# Patient Record
Sex: FEMALE | Race: WHITE
[De-identification: names, ages, dates, MRNs, and addresses within clinical notes are randomized per-mention and may not be internally consistent; named-entity substitution may affect disease eponyms.]

---

## 2019-08-11 ENCOUNTER — HOSPITAL ENCOUNTER (INPATIENT)
Dept: HOSPITAL 72 - EMR | Age: 74
LOS: 2 days | Discharge: HOME | DRG: 425 | End: 2019-08-13
Payer: MEDICARE

## 2019-08-11 VITALS — DIASTOLIC BLOOD PRESSURE: 74 MMHG | SYSTOLIC BLOOD PRESSURE: 179 MMHG

## 2019-08-11 VITALS — WEIGHT: 126 LBS | BODY MASS INDEX: 22.61 KG/M2 | HEIGHT: 62.5 IN

## 2019-08-11 VITALS — DIASTOLIC BLOOD PRESSURE: 54 MMHG | SYSTOLIC BLOOD PRESSURE: 103 MMHG

## 2019-08-11 VITALS — SYSTOLIC BLOOD PRESSURE: 166 MMHG | DIASTOLIC BLOOD PRESSURE: 85 MMHG

## 2019-08-11 VITALS — DIASTOLIC BLOOD PRESSURE: 65 MMHG | SYSTOLIC BLOOD PRESSURE: 174 MMHG

## 2019-08-11 DIAGNOSIS — R19.7: ICD-10-CM

## 2019-08-11 DIAGNOSIS — E87.2: ICD-10-CM

## 2019-08-11 DIAGNOSIS — N39.0: ICD-10-CM

## 2019-08-11 DIAGNOSIS — E87.5: Primary | ICD-10-CM

## 2019-08-11 DIAGNOSIS — Z91.15: ICD-10-CM

## 2019-08-11 DIAGNOSIS — N18.6: ICD-10-CM

## 2019-08-11 DIAGNOSIS — B96.1: ICD-10-CM

## 2019-08-11 DIAGNOSIS — I12.0: ICD-10-CM

## 2019-08-11 LAB
ADD MANUAL DIFF: YES
ALBUMIN SERPL-MCNC: 4 G/DL (ref 3.4–5)
ALBUMIN/GLOB SERPL: 0.9 {RATIO} (ref 1–2.7)
ALP SERPL-CCNC: 157 U/L (ref 46–116)
ALT SERPL-CCNC: 39 U/L (ref 12–78)
ANION GAP SERPL CALC-SCNC: 20 MMOL/L (ref 5–15)
ANION GAP SERPL CALC-SCNC: 23 MMOL/L (ref 5–15)
APPEARANCE UR: (no result)
APTT BLD: 52 SEC (ref 23–33)
APTT PPP: (no result) S
AST SERPL-CCNC: 38 U/L (ref 15–37)
BILIRUB SERPL-MCNC: 0.6 MG/DL (ref 0.2–1)
BUN SERPL-MCNC: 230 MG/DL (ref 7–18)
BUN SERPL-MCNC: 243 MG/DL (ref 7–18)
CALCIUM SERPL-MCNC: 10.1 MG/DL (ref 8.5–10.1)
CALCIUM SERPL-MCNC: 10.7 MG/DL (ref 8.5–10.1)
CHLORIDE SERPL-SCNC: 102 MMOL/L (ref 98–107)
CHLORIDE SERPL-SCNC: 102 MMOL/L (ref 98–107)
CO2 SERPL-SCNC: 15 MMOL/L (ref 21–32)
CO2 SERPL-SCNC: 17 MMOL/L (ref 21–32)
CREAT SERPL-MCNC: 13.7 MG/DL (ref 0.55–1.3)
CREAT SERPL-MCNC: 14 MG/DL (ref 0.55–1.3)
ERYTHROCYTE [DISTWIDTH] IN BLOOD BY AUTOMATED COUNT: 14 % (ref 11.6–14.8)
GLOBULIN SER-MCNC: 4.3 G/DL
GLUCOSE UR STRIP-MCNC: (no result) MG/DL
HCT VFR BLD CALC: 46.9 % (ref 37–47)
HGB BLD-MCNC: 15.9 G/DL (ref 12–16)
INR PPP: 1.1 (ref 0.9–1.1)
KETONES UR QL STRIP: NEGATIVE
LEUKOCYTE ESTERASE UR QL STRIP: (no result)
MCV RBC AUTO: 106 FL (ref 80–99)
NITRITE UR QL STRIP: NEGATIVE
PH UR STRIP: 6 [PH] (ref 4.5–8)
PLATELET # BLD: 94 K/UL (ref 150–450)
POTASSIUM SERPL-SCNC: 5 MMOL/L (ref 3.5–5.1)
POTASSIUM SERPL-SCNC: 7.9 MMOL/L (ref 3.5–5.1)
PROT UR QL STRIP: (no result)
RBC # BLD AUTO: 4.42 M/UL (ref 4.2–5.4)
SODIUM SERPL-SCNC: 136 MMOL/L (ref 136–145)
SODIUM SERPL-SCNC: 142 MMOL/L (ref 136–145)
SP GR UR STRIP: 1.01 (ref 1–1.03)
UROBILINOGEN UR-MCNC: NORMAL MG/DL (ref 0–1)
WBC # BLD AUTO: 6 K/UL (ref 4.8–10.8)

## 2019-08-11 PROCEDURE — 87081 CULTURE SCREEN ONLY: CPT

## 2019-08-11 PROCEDURE — 81001 URINALYSIS AUTO W/SCOPE: CPT

## 2019-08-11 PROCEDURE — 80053 COMPREHEN METABOLIC PANEL: CPT

## 2019-08-11 PROCEDURE — 82962 GLUCOSE BLOOD TEST: CPT

## 2019-08-11 PROCEDURE — 93005 ELECTROCARDIOGRAM TRACING: CPT

## 2019-08-11 PROCEDURE — 85730 THROMBOPLASTIN TIME PARTIAL: CPT

## 2019-08-11 PROCEDURE — 87086 URINE CULTURE/COLONY COUNT: CPT

## 2019-08-11 PROCEDURE — 85025 COMPLETE CBC W/AUTO DIFF WBC: CPT

## 2019-08-11 PROCEDURE — 5A1D70Z PERFORMANCE OF URINARY FILTRATION, INTERMITTENT, LESS THAN 6 HOURS PER DAY: ICD-10-PCS

## 2019-08-11 PROCEDURE — 85610 PROTHROMBIN TIME: CPT

## 2019-08-11 PROCEDURE — 80048 BASIC METABOLIC PNL TOTAL CA: CPT

## 2019-08-11 PROCEDURE — 87181 SC STD AGAR DILUTION PER AGT: CPT

## 2019-08-11 PROCEDURE — 85007 BL SMEAR W/DIFF WBC COUNT: CPT

## 2019-08-11 PROCEDURE — 84484 ASSAY OF TROPONIN QUANT: CPT

## 2019-08-11 PROCEDURE — 96375 TX/PRO/DX INJ NEW DRUG ADDON: CPT

## 2019-08-11 PROCEDURE — 96365 THER/PROPH/DIAG IV INF INIT: CPT

## 2019-08-11 PROCEDURE — 84443 ASSAY THYROID STIM HORMONE: CPT

## 2019-08-11 PROCEDURE — 94640 AIRWAY INHALATION TREATMENT: CPT

## 2019-08-11 PROCEDURE — 74176 CT ABD & PELVIS W/O CONTRAST: CPT

## 2019-08-11 PROCEDURE — 99285 EMERGENCY DEPT VISIT HI MDM: CPT

## 2019-08-11 PROCEDURE — 36415 COLL VENOUS BLD VENIPUNCTURE: CPT

## 2019-08-11 NOTE — NUR
NURSE NOTES:

Received pt and report from CHERELLE Rodriguez from [facility]. DX Hyperkalemia, pt's awake alert, 
oriented x 4, SR on the monitor /65 with two AV shunts Rt arm amd left arm, both are 
non working. Left femoral AV shunt with good bruit . Pt verbalized that left femoral shunt 
have been used for hemodialysis. Boyfriend at bedside. HOB elevated, bed in low and locked 
position. Will continue to monitor.

## 2019-08-11 NOTE — NUR
ED Nurse Note:

pt walked in to ED with boyfriend due to feeling depressed than normal.  old 
shunt noted on both upper arms.  per pt, access on left groin area.  every Tue, 
Thurs and Sat.  last one done about week ago.  pt does not want to talk why she 
missed the dialysis.  AAO x4.  respirations even and non-labored noted.  pt 
walk with cane.  on cardiac monitor.  will wait for the further order.

## 2019-08-11 NOTE — NUR
NURSE NOTES:

called Dr Tellez and jyoti vasques of pts condition, and lab results, with orders given and 
carried out.

## 2019-08-11 NOTE — NUR
NURSE NOTES:

Received pt from ER with DX Hyperkalemia, awake alert, oriented x 3 limon x4 SR on the monitor 
/85 with AV shunt Rt arm, left arm both are non working. Left femoral AV shunt with 
good bruit . Pt verbalized that left femoral shunt have been used for hemodialysis. 
boyfriend at bedside. explained SDu protocol and set up= verbalized understanding.

## 2019-08-11 NOTE — NUR
NURSE NOTES:

[facility] is at the bedside, starting to dialyze the pt. Pt's stable, in no acute distress. 
Will continue to monitor.

## 2019-08-11 NOTE — EMERGENCY ROOM REPORT
History of Present Illness


General


Chief Complaint:  General Complaint


Source:  Patient





Present Illness


HPI


Patient is a 73-year-old female who presents after increased diarrheal stools 

as well as increased generalized weakness.  Patient reports having missed 

dialysis and states her last dialysis was greater than 1 week ago  She states 

she normally dialyzed 3 times a week.  Reports having prior history of end-

stage renal disease.  She has dialysis access to the left femoral area.  

Patient does report making urine.Patient's nephrologist is Dr. Figueredo.Patient 

reports having multiple episodes of diarrheal stools.  Patient does report 

feeling constipated


Allergies:  


Coded Allergies:  


     No Known Allergies (Unverified , 8/11/19)





Patient History


Past Medical History:  see triage record


Reviewed Nursing Documentation:  PMH: Agreed; PSxH: Agreed





Nursing Documentation-PMH


Past Medical History:  No History, Except For


Hx Hypertension:  Yes


Hx Dialysis:  Yes - Tue, Thurs, Sat





Review of Systems


All Other Systems:  limited - by poor historian





Physical Exam





Vital Signs








  Date Time  Temp Pulse Resp B/P (MAP) Pulse Ox O2 Delivery O2 Flow Rate FiO2


 


8/11/19 16:42 97.3 80 18 179/74 (109) 100 Room Air  








Sp02 EP Interpretation:  reviewed, normal


General Appearance:  well appearing, alert, GCS 15, Chronically Ill


Head:  atraumatic


ENT:  normal ENT inspection, hearing grossly normal, normal voice


Neck:  normal inspection, supple, no bony tend, limited range of motion


Respiratory:  normal inspection, lungs clear, normal breath sounds, no 

respiratory distress, no retraction, no wheezing


Cardiovascular #1:  regular rate, rhythm


Gastrointestinal:  normal bowel sounds, soft, no guarding, no hernia, 

tenderness - left upper abdomen


Genitourinary:  no CVA tenderness


Musculoskeletal:  normal inspection, back normal, normal range of motion


Neurologic:  normal inspection, alert, oriented x3, responsive, speech normal


Psychiatric:  normal inspection, judgement/insight normal, mood/affect normal





Medical Decision Making


Diagnostic Impression:  


 Primary Impression:  


 Missed dialysis


 Additional Impressions:  


 Hyperkalemia


 Uremia


 Metabolic acidosis


 ESRD (end stage renal disease) on dialysis


ER Course


Patient presented for increased Abdominal pain and missed dialysis.  

Differential diagnosis include was not limited to hyperkalemia, pericarditis, 

myocardial injury, uremia among others.  Because of complexity of patient's 

case laboratory testing and imaging studies were ordered.   patient is noted to 

have increased generalized weakness.Laboratory testing showed markedly elevated 

serum potassium as well as elevated BUN/creatinine consistent with patient's 

history of missed dialysis. Patient does not appear to be in any respiratory 

distress.  CT imaging of the abdomen pelvis was ordered due to patient's 

nonspecific abdominal pain.  CT read by radiology showed diffuse osteosclerosis 

patient was noted to have no CT imaging findings suspicious for bowel ischemia. 

She was given IV Lasix as well as oral Kayexalate.  EKG showed left bundle 

branch block.  Dr. Robbie Tellez was contacted for inpatient management due 

to panel physician.





Labs








Test


  8/11/19


17:48


 


White Blood Count


  6.0 K/UL


(4.8-10.8)


 


Red Blood Count


  4.42 M/UL


(4.20-5.40)


 


Hemoglobin


  15.9 G/DL


(12.0-16.0)


 


Hematocrit


  46.9 %


(37.0-47.0)


 


Mean Corpuscular Volume 106 FL (80-99) 


 


Mean Corpuscular Hemoglobin


  36.1 PG


(27.0-31.0)


 


Mean Corpuscular Hemoglobin


Concent 34.0 G/DL


(32.0-36.0)


 


Red Cell Distribution Width


  14.0 %


(11.6-14.8)


 


Platelet Count


  94 K/UL


(150-450)


 


Mean Platelet Volume


  6.6 FL


(6.5-10.1)


 


Neutrophils (%) (Auto)  % (45.0-75.0) 


 


Lymphocytes (%) (Auto)  % (20.0-45.0) 


 


Monocytes (%) (Auto)  % (1.0-10.0) 


 


Eosinophils (%) (Auto)  % (0.0-3.0) 


 


Basophils (%) (Auto)  % (0.0-2.0) 


 


Prothrombin Time


  11.4 SEC


(9.30-11.50)


 


Prothromb Time International


Ratio 1.1 (0.9-1.1) 


 


 


Activated Partial


Thromboplast Time 52 SEC (23-33) 


 


 


Urine Color Pale yellow 


 


Urine Appearance


  Slightly


cloudy


 


Urine pH 6 (4.5-8.0) 


 


Urine Specific Gravity


  1.010


(1.005-1.035)


 


Urine Protein 3+ (NEGATIVE) 


 


Urine Glucose (UA) 2+ (NEGATIVE) 


 


Urine Ketones


  Negative


(NEGATIVE)


 


Urine Blood 5+ (NEGATIVE) 


 


Urine Nitrite


  Negative


(NEGATIVE)


 


Urine Bilirubin


  Negative


(NEGATIVE)


 


Urine Urobilinogen


  Normal MG/DL


(0.0-1.0)


 


Urine Leukocyte Esterase 3+ (NEGATIVE) 








EKG Diagnostic Results


Rate:  normal - 80


Rhythm:  NSR


ST Segments:  other - left bundle branch block





Rhythm Strip Diag. Results


EP Interpretation:  yes


Rhythm:  NSR, no PVC's, no ectopy





Last Vital Signs








  Date Time  Temp Pulse Resp B/P (MAP) Pulse Ox O2 Delivery O2 Flow Rate FiO2


 


8/11/19 16:42 97.3 80 18 179/74 100 Room Air  








Status:  unchanged


Disposition:  ADMITTED AS INPATIENT


Condition:  Serious











Dandy Montana MD Aug 11, 2019 17:12

## 2019-08-11 NOTE — DIAGNOSTIC IMAGING REPORT
Indication: Abdominal pain

 

Technique: Continuous helical transaxial imaging of the abdomen and pelvis was

obtained from the lung bases to the pubic symphysis. No intravenous contrast was

administered. Coronal 2-D reformats were also obtained. Automatic Exposure Control

was utilized.

 

 

Total Dose length Product (DLP):  565.62 mGycm

 

CT Dose Index Volume (CTDIvol):   12.77 mGy

 

Comparison: none

 

Findings: There is scarring in the lingula. Hiatal hernia is present. The aorta is

mildly calcified. Both kidneys appear atrophic and there are multiple cysts present.

The gallbladder is distended without obvious stones. There are diverticula in the

colon. Nature of the uterus is noted. There is a small calcification in the right

aspect of the uterus nonspecific. There is probable identification of a normal

appendix the right hemipelvis. There are no secondary signs of acute appendicitis.

Aorta is diffusely calcified. There is narrowing of intervertebral discs and

accompanying endplate osteophyte formation.  Hypertrophied facet joints also

demonstrated. The bones are diffusely sclerotic. This could be related to renal

osteodystrophy. Correlate clinically. Other possibilities such as metastatic disease

not excluded.

 

IMPRESSION:

 

No acute findings identified to account for abdominal pain.

 

Abnormal bones probably related to chronic renal disease. Metastatic neoplasm not

excluded.

 

Multiple incidental findings as described above.

 

[facility] has communicated the preliminary results to the Emergency

Department.  Their findings are largely concordant with this report.

 

 

The CT scanner at [facility] is accredited by the American College of

Radiology and the scans are performed using dose optimization techniques as

appropriate to a performed exam including Automatic Exposure control.

## 2019-08-11 NOTE — NUR
TRANSFER TO FLOOR:

Patient transferred to  as ordered, per Dr Stephen .   Report given to CHERELLE Rodriguez.  Belongings and medications given to

. Family and or S/O informed of transfer.

## 2019-08-11 NOTE — NUR
NURSE NOTES:called Dr Khan for pts consult per Dr Tellez. Aware Dr Khan of pt 
condition and abnormal lab results, with orders of  [facility] and transfer pt to ICU.

## 2019-08-12 VITALS — DIASTOLIC BLOOD PRESSURE: 134 MMHG | SYSTOLIC BLOOD PRESSURE: 170 MMHG

## 2019-08-12 VITALS — DIASTOLIC BLOOD PRESSURE: 53 MMHG | SYSTOLIC BLOOD PRESSURE: 142 MMHG

## 2019-08-12 VITALS — DIASTOLIC BLOOD PRESSURE: 53 MMHG | SYSTOLIC BLOOD PRESSURE: 135 MMHG

## 2019-08-12 VITALS — DIASTOLIC BLOOD PRESSURE: 60 MMHG | SYSTOLIC BLOOD PRESSURE: 154 MMHG

## 2019-08-12 VITALS — DIASTOLIC BLOOD PRESSURE: 63 MMHG | SYSTOLIC BLOOD PRESSURE: 167 MMHG

## 2019-08-12 VITALS — SYSTOLIC BLOOD PRESSURE: 110 MMHG | DIASTOLIC BLOOD PRESSURE: 73 MMHG

## 2019-08-12 VITALS — SYSTOLIC BLOOD PRESSURE: 162 MMHG | DIASTOLIC BLOOD PRESSURE: 77 MMHG

## 2019-08-12 VITALS — SYSTOLIC BLOOD PRESSURE: 164 MMHG | DIASTOLIC BLOOD PRESSURE: 66 MMHG

## 2019-08-12 VITALS — DIASTOLIC BLOOD PRESSURE: 77 MMHG | SYSTOLIC BLOOD PRESSURE: 166 MMHG

## 2019-08-12 VITALS — DIASTOLIC BLOOD PRESSURE: 75 MMHG | SYSTOLIC BLOOD PRESSURE: 144 MMHG

## 2019-08-12 VITALS — DIASTOLIC BLOOD PRESSURE: 131 MMHG | SYSTOLIC BLOOD PRESSURE: 157 MMHG

## 2019-08-12 VITALS — SYSTOLIC BLOOD PRESSURE: 119 MMHG | DIASTOLIC BLOOD PRESSURE: 61 MMHG

## 2019-08-12 VITALS — SYSTOLIC BLOOD PRESSURE: 163 MMHG | DIASTOLIC BLOOD PRESSURE: 80 MMHG

## 2019-08-12 VITALS — DIASTOLIC BLOOD PRESSURE: 59 MMHG | SYSTOLIC BLOOD PRESSURE: 97 MMHG

## 2019-08-12 VITALS — SYSTOLIC BLOOD PRESSURE: 166 MMHG | DIASTOLIC BLOOD PRESSURE: 66 MMHG

## 2019-08-12 VITALS — SYSTOLIC BLOOD PRESSURE: 168 MMHG | DIASTOLIC BLOOD PRESSURE: 72 MMHG

## 2019-08-12 VITALS — SYSTOLIC BLOOD PRESSURE: 138 MMHG | DIASTOLIC BLOOD PRESSURE: 64 MMHG

## 2019-08-12 VITALS — DIASTOLIC BLOOD PRESSURE: 70 MMHG | SYSTOLIC BLOOD PRESSURE: 160 MMHG

## 2019-08-12 VITALS — DIASTOLIC BLOOD PRESSURE: 77 MMHG | SYSTOLIC BLOOD PRESSURE: 169 MMHG

## 2019-08-12 VITALS — DIASTOLIC BLOOD PRESSURE: 54 MMHG | SYSTOLIC BLOOD PRESSURE: 126 MMHG

## 2019-08-12 VITALS — SYSTOLIC BLOOD PRESSURE: 158 MMHG | DIASTOLIC BLOOD PRESSURE: 79 MMHG

## 2019-08-12 VITALS — DIASTOLIC BLOOD PRESSURE: 68 MMHG | SYSTOLIC BLOOD PRESSURE: 171 MMHG

## 2019-08-12 VITALS — DIASTOLIC BLOOD PRESSURE: 84 MMHG | SYSTOLIC BLOOD PRESSURE: 123 MMHG

## 2019-08-12 VITALS — DIASTOLIC BLOOD PRESSURE: 73 MMHG | SYSTOLIC BLOOD PRESSURE: 171 MMHG

## 2019-08-12 VITALS — DIASTOLIC BLOOD PRESSURE: 62 MMHG | SYSTOLIC BLOOD PRESSURE: 129 MMHG

## 2019-08-12 VITALS — DIASTOLIC BLOOD PRESSURE: 64 MMHG | SYSTOLIC BLOOD PRESSURE: 170 MMHG

## 2019-08-12 VITALS — SYSTOLIC BLOOD PRESSURE: 146 MMHG | DIASTOLIC BLOOD PRESSURE: 58 MMHG

## 2019-08-12 VITALS — DIASTOLIC BLOOD PRESSURE: 131 MMHG | SYSTOLIC BLOOD PRESSURE: 150 MMHG

## 2019-08-12 VITALS — SYSTOLIC BLOOD PRESSURE: 160 MMHG | DIASTOLIC BLOOD PRESSURE: 70 MMHG

## 2019-08-12 VITALS — DIASTOLIC BLOOD PRESSURE: 58 MMHG | SYSTOLIC BLOOD PRESSURE: 174 MMHG

## 2019-08-12 VITALS — SYSTOLIC BLOOD PRESSURE: 137 MMHG | DIASTOLIC BLOOD PRESSURE: 59 MMHG

## 2019-08-12 VITALS — SYSTOLIC BLOOD PRESSURE: 137 MMHG | DIASTOLIC BLOOD PRESSURE: 50 MMHG

## 2019-08-12 VITALS — DIASTOLIC BLOOD PRESSURE: 79 MMHG | SYSTOLIC BLOOD PRESSURE: 158 MMHG

## 2019-08-12 VITALS — SYSTOLIC BLOOD PRESSURE: 144 MMHG | DIASTOLIC BLOOD PRESSURE: 58 MMHG

## 2019-08-12 LAB
ADD MANUAL DIFF: NO
ANION GAP SERPL CALC-SCNC: 17 MMOL/L (ref 5–15)
BUN SERPL-MCNC: 125 MG/DL (ref 7–18)
CALCIUM SERPL-MCNC: 9.9 MG/DL (ref 8.5–10.1)
CHLORIDE SERPL-SCNC: 102 MMOL/L (ref 98–107)
CO2 SERPL-SCNC: 26 MMOL/L (ref 21–32)
CREAT SERPL-MCNC: 8.1 MG/DL (ref 0.55–1.3)
ERYTHROCYTE [DISTWIDTH] IN BLOOD BY AUTOMATED COUNT: 13.3 % (ref 11.6–14.8)
HCT VFR BLD CALC: 42.7 % (ref 37–47)
HGB BLD-MCNC: 14.8 G/DL (ref 12–16)
MCV RBC AUTO: 104 FL (ref 80–99)
PLATELET # BLD: 94 K/UL (ref 150–450)
POTASSIUM SERPL-SCNC: 2.8 MMOL/L (ref 3.5–5.1)
RBC # BLD AUTO: 4.09 M/UL (ref 4.2–5.4)
SODIUM SERPL-SCNC: 145 MMOL/L (ref 136–145)
WBC # BLD AUTO: 7.9 K/UL (ref 4.8–10.8)

## 2019-08-12 RX ADMIN — CARBAMAZEPINE SCH MG: 200 TABLET ORAL at 09:02

## 2019-08-12 RX ADMIN — ALBUTEROL SULFATE SCH PUFF: 108 AEROSOL, METERED RESPIRATORY (INHALATION) at 01:00

## 2019-08-12 RX ADMIN — CARBAMAZEPINE SCH MG: 200 TABLET ORAL at 21:27

## 2019-08-12 RX ADMIN — CLONAZEPAM SCH MG: 0.5 TABLET ORAL at 00:33

## 2019-08-12 RX ADMIN — ALBUTEROL SULFATE SCH PUFF: 108 AEROSOL, METERED RESPIRATORY (INHALATION) at 13:55

## 2019-08-12 RX ADMIN — DOCUSATE SODIUM SCH MG: 100 TABLET ORAL at 09:02

## 2019-08-12 RX ADMIN — CLONAZEPAM SCH MG: 0.5 TABLET ORAL at 12:04

## 2019-08-12 RX ADMIN — ALBUTEROL SULFATE SCH PUFF: 108 AEROSOL, METERED RESPIRATORY (INHALATION) at 09:59

## 2019-08-12 RX ADMIN — CLONAZEPAM SCH MG: 0.5 TABLET ORAL at 06:06

## 2019-08-12 RX ADMIN — CLONAZEPAM SCH MG: 0.5 TABLET ORAL at 17:29

## 2019-08-12 RX ADMIN — DOCUSATE SODIUM SCH MG: 100 TABLET ORAL at 17:25

## 2019-08-12 RX ADMIN — ALBUTEROL SULFATE SCH PUFF: 108 AEROSOL, METERED RESPIRATORY (INHALATION) at 19:00

## 2019-08-12 RX ADMIN — CARBAMAZEPINE SCH MG: 200 TABLET ORAL at 00:33

## 2019-08-12 NOTE — NUR
NURSE NOTES:

Pt's resting in bed, in no acute distress. VS stable. /77, R 16, O2 sat 100% on RA. HD 
done, 2L out. Will continue to monitor.

## 2019-08-12 NOTE — CONSULTATION
DATE OF CONSULTATION:  08/12/2019

NEPHROLOGY CONSULTATION



CONSULTING PHYSICIAN:  Uzair Khan M.D.



ATTENDING PHYSICIAN:  Robbie Tellez M.D.



REASON FOR CONSULTATION:  This is a dialysis patient.



HISTORY OF PRESENT ILLNESS:  This is a 73-year-old female who is on

dialysis in the  [facility] on [address].  The

patient's primary care physician and nephrologist is Dr. Tim Figueredo.

She usually goes to [facility].  The patient missed more than a week of

dialysis.  She came to the ER and was brought in by caretaker, Mr. Crispin Valles.  The patient was complaining of multiple complaints including

weakness, shortness of breath, and diarrhea.



PAST MEDICAL HISTORY:

1. End-stage renal failure.

2. Hypertensive cardiovascular disease.

3. Anemia of chronic kidney disease.



MEDICATIONS:  Currently unknown.  We will have to get the [facility] records.



REVIEW OF SYSTEMS:  HEENT:  Hearing and eyesight are normal.  ENDOCRINE:

No history of diabetes, thyroid, or adrenal problems.  RESPIRATORY:  She

denies shortness of breath, cough, or hemoptysis.  CARDIAC:  She denies

chest pain or palpitations.    GENITOURINARY:  She denies dysuria,

frequency, urgency, or hematuria.



PHYSICAL EXAMINATION:

GENERAL:  This is an elderly female who looks chronically ill and

cachectic.

GENERAL:  This is an elderly  female who is in no acute

distress.

VITAL SIGNS:  Blood pressure 163/80, pulse 96, respirations 16, and

temperature 98.4.

HEENT:  The head is normocephalic and atraumatic.  Pupils are equal, round,

and reactive to light and accommodation consensually.

NECK:  Supple.  Trachea midline.  There was no lymphadenopathy or

thyromegaly.

LUNGS:  Clear to auscultation and percussion.

HEART:  Regular rate and rhythm without rubs, murmurs, or gallops.

ABDOMEN:  Soft and nontender.  Bowel sounds were active.

EXTREMITIES:  No clubbing, cyanosis, or edema.  She has a left groin AV

graft.

NEUROLOGIC:  She is alert and oriented x4.  Cranial nerves II through XII

intact.



LABORATORY AND ANCILLARY DATA:  Hemoglobin 15.9 and WBCs 6.  Predialysis

potassium 7.9.  Postdialysis 2.8.  Predialysis sodium 136 and post 145.

Predialysis , post 125.  Predialysis creatinine 14 and post 8.1.



ASSESSMENT:  Uremia due to noncompliance.



PLAN:

1. Back-to-back hemodialysis.

2. Control blood pressure.









  ______________________________________________

  Uzair Khan M.D.





DR:  HARVEY

D:  08/12/2019 12:51

T:  08/12/2019 22:33

JOB#:  [id number]

CC:



SONIYA

## 2019-08-12 NOTE — NUR
NURSE NOTES:

Received change of shift report from Juan RN. Pt is awake, alert, oriented x4, sitting up in 
bed in semi-crawley's position. Pt is currently on room air with O2sat at 98% and bilateral 
diminished breath sounds on auscultation. Cardiac monitor displays ST currently with heart 
rate in the low 100's. Bounding peripheral pulses noted with no edema. Pt has two peripheral 
IV access. Also, bilateral AV shunts on upper extremities which are currently not being 
used. Pt also has central line IV access on left groin/upper extremity for HD access, with 
bruit/thrill on assessment. Abdomen is large, round, soft/nontender to touch with 
hyperactive bowel wounds. Skin is intact. Bed is locked with three side rails up, in lowest 
position and call light within easy reach. Will continue to monitor pt and follow plan of 
care per MD orders and protocol.

## 2019-08-12 NOTE — NUR
NURSE NOTES:

Received report from Melva VILLARREAL. patient in bed awake, alert able to verbalize needs known to 
staff. Denies any pain or discomfort. No SOB, oxygen saturation 98% room air. IV line on 
Right FA and Left AC intact. assisted to Bedside commode. Instructed patient to use  call 
light for assistance. Bed alarm on. Bed locked and in low position. Dialysis access AV shunt 
intact on left upper leg. Called TELE spoke with Hawa charge nurse regarding the pt with 
order to transfer to tele per charge nurse call back in 15 min. Will continue plan of care.

## 2019-08-12 NOTE — NUR
TRANSFER TO FLOOR:

Patient transferred to Telemetry 203-1, per Dr Tellez.   Report given to Maria Eugenia Mcneil RN 
.  Belongings and medications given to Maria Eugenia Mcneil RN. Family and or S/O informed of 
transfer.

## 2019-08-12 NOTE — NUR
NURSE NOTES:

Currently still waiting for bed availability and assignment to transfer pt to telemetry per 
MD order. VS stable.

## 2019-08-12 NOTE — NUR
NURSE NOTES:

Pt has had another episode of bowel movement, normal/soft/formed/brown. Pt uses bedside 
commode. Pt assisted, cleaned and back in bed with stable VS.

## 2019-08-12 NOTE — HISTORY AND PHYSICAL REPORT
DATE OF ADMISSION:  08/11/2019

REASON FOR ADMISSION:  Hyperkalemia.



HISTORY:  This is a 73-year-old female who apparently lives with her

boyfriend, presents after increased diarrhea as well as generalized

weakness.  The patient is seen and evaluated in the emergency room and was

noted to have elevated potassium.  The patient apparently missed her

dialysis and is under the care of Dr. Figueredo for Nephrology.  The

patient had multiple bouts of diarrhea.  The patient currently reports

being constipated and is looking for a stool softener.  The patient is

stable after hemodialysis and improved with potassium.  The

patient is given medications in the emergency room.  The patient has had

no fevers or chills.  No palpitations.  No evidence of dysrhythmia on EKG

in the emergency room.  The patient's events were reviewed and discussed

with the ER physician, with renal, and with the RN.



PAST MEDICAL HISTORY:  Notable for hypertension and end-stage renal

disease.



MEDICATIONS:  Reviewed.



ALLERGIES:  Reviewed.



REVIEW OF SYSTEMS:  All 10-points is reviewed and otherwise

negative.



PHYSICAL EXAMINATION:

GENERAL:  A well-developed female, otherwise comfortable.

VITAL SIGNS:  Reviewed.  Blood pressure 134/60, pulse 94, respirations of

15, temperature is normal at 97.3, and oxygen saturation 95% on room

air.

HEENT:  Fairly negative.  Neck is supple.  The patient with poor dentition

and missing teeth.

NECK:  Otherwise supple.

LUNGS:  Otherwise clear.  No rhonchi or wheezes.

CARDIAC:   S1, S2.  Regular rate and rhythm without murmurs, rubs, or

gallops.

ABDOMEN:  Otherwise soft, nontender, and nondistended.

EXTREMITIES:  No cyanosis, clubbing, or edema.

NEUROLOGIC:  Grossly nonfocal.  Alert and oriented x3.



LABORATORY DATA:  Otherwise reviewed in detail.  Potassium as mentioned was

elevated, but now improved.  BUN and creatinine are elevated due to her

end-stage renal disease.  Abdominal CT shows diffuse osteosclerosis,

likely due to renal osteodystrophy.



IMPRESSIONS:

1. Diarrhea, overall resolved, now constipation.

2. End-stage renal disease.

3. Hyperkalemia, acute, now resolved after hemodialysis.



RECOMMENDATIONS:  Supportive care.  Transfer to telemetry.  Monitor

overnight.  If stable, will be discharged to home with home health and

maintenance hemodialysis by primary Nephrology.









  ______________________________________________

  Robbie Tellez M.D.





DR:  CORINA

D:  08/12/2019 09:44

T:  08/12/2019 18:07

JOB#:  [id number]

CC:



SONIYA

## 2019-08-12 NOTE — NUR
NURSE NOTES:

VS stable. Pt is resting in no apparent distress. Pt has been cleaned. Bed linens and gown 
changed.

## 2019-08-12 NOTE — NUR
NURSE NOTES:

Pt consumed 50% of breakfast and had x1 episode of normal bowel movement, soft/formed brown. 
VS stable. Gown/linens were changed and pt assisting with repositioning. Lower extremities 
are elevated on pillows.

## 2019-08-12 NOTE — NUR
NURSE NOTES:

Pt was seen by Dr Tellez at bedside. Order was received, processed and followed to replace 
K level of 2.8 with KDur 40meq PO x1. Order also received to transfer pt to Telemetry and 
processed. Will be waiting for bed placement.

## 2019-08-12 NOTE — NUR
NURSE NOTES:

Pt is resting with family at bedside. VS stable. Pt consumed 75% of lunch meal. Remains 
afebrile. Pt had another episode of BMx1, normal/brown/soft/formed. Pt was assisted to/from 
bedside commode and now resting in bed with stable VS and in no apparent distress. Currently 
awaiting for bed availability to transfer pt to telemetry per MD order.

## 2019-08-12 NOTE — NUR
NURSE NOTES:

Pt was seen by Dr Herrera at bedside. MD aware of lab results, K replacement and yesterdays 
HD treatment, 2L out.

## 2019-08-12 NOTE — NUR
72 YO FEMALE FROM HOME TO ER



CC    PT NOT FEELING WELL, FEELING DEPRESSED HAVEN'T HAD HD IN 2 WEEKS



SI:   HYPERKALEMIA,ABNORMAL LABS

T. 97.4 HR 80 RR 18 B/P 179/74

K 7.9 AST 20 ALT 38

UA+PROTEIN,GLUCOSE,LEUKOCYTE ESTERASE,SQUAMOUS EPITH CELL,WBC,BACTERIA





IS:    KAYEXALATE PO

CALCIUM GLUCONATE IV

NA BICARB IV

LASIX IV

*******ADMITTED TO ICU @ 2045******

**********ICU STATUS*********







DCP   RETURN HOME

## 2019-08-12 NOTE — NUR
NURSE NOTES:

Receiving note:

Patient received from CHERELLE Buenrostro from ICU to tele. Pt in stable condition, VS WNL, on room 
air, 98% O2 in no acute distress, alert x4, at times tangential thinking. Pt is sitting up 
in bed, awake, alert, Denies any pain or discomfort. No SOB, IV line on Right FA intact, SL. 
requires assistance to BSC. Instructed patient to use call light for assistance. Bed locked, 
in lowest position, side rails x2, bed alarm on. Dialysis access AV shunt intact on left 
upper leg. Belongings list accounted for, signed. oriented pt to room and unit. No questions 
at this time. Will continue plan of care.

## 2019-08-12 NOTE — NUR
NURSE NOTES:

Order noted for bedside hemodialysis for tomorrow by [facility]. Dialysis center was contacted, 
spoke with Jeanie and HD has been confirmed to tomorrow 8/13/19.

## 2019-08-13 VITALS — SYSTOLIC BLOOD PRESSURE: 153 MMHG | DIASTOLIC BLOOD PRESSURE: 70 MMHG

## 2019-08-13 VITALS — SYSTOLIC BLOOD PRESSURE: 190 MMHG | DIASTOLIC BLOOD PRESSURE: 90 MMHG

## 2019-08-13 VITALS — SYSTOLIC BLOOD PRESSURE: 135 MMHG | DIASTOLIC BLOOD PRESSURE: 77 MMHG

## 2019-08-13 VITALS — DIASTOLIC BLOOD PRESSURE: 93 MMHG | SYSTOLIC BLOOD PRESSURE: 160 MMHG

## 2019-08-13 LAB
ADD MANUAL DIFF: YES
ANION GAP SERPL CALC-SCNC: 17 MMOL/L (ref 5–15)
BUN SERPL-MCNC: 137 MG/DL (ref 7–18)
CALCIUM SERPL-MCNC: 9.6 MG/DL (ref 8.5–10.1)
CHLORIDE SERPL-SCNC: 102 MMOL/L (ref 98–107)
CO2 SERPL-SCNC: 22 MMOL/L (ref 21–32)
CREAT SERPL-MCNC: 9.8 MG/DL (ref 0.55–1.3)
ERYTHROCYTE [DISTWIDTH] IN BLOOD BY AUTOMATED COUNT: 14.3 % (ref 11.6–14.8)
HCT VFR BLD CALC: 44 % (ref 37–47)
HGB BLD-MCNC: 14.4 G/DL (ref 12–16)
MCV RBC AUTO: 109 FL (ref 80–99)
PLATELET # BLD: 61 K/UL (ref 150–450)
POTASSIUM SERPL-SCNC: 3.3 MMOL/L (ref 3.5–5.1)
RBC # BLD AUTO: 4.04 M/UL (ref 4.2–5.4)
SODIUM SERPL-SCNC: 141 MMOL/L (ref 136–145)
WBC # BLD AUTO: 5.9 K/UL (ref 4.8–10.8)

## 2019-08-13 RX ADMIN — ALBUTEROL SULFATE SCH PUFF: 108 AEROSOL, METERED RESPIRATORY (INHALATION) at 13:00

## 2019-08-13 RX ADMIN — ALBUTEROL SULFATE SCH PUFF: 108 AEROSOL, METERED RESPIRATORY (INHALATION) at 01:00

## 2019-08-13 RX ADMIN — CLONAZEPAM SCH MG: 0.5 TABLET ORAL at 05:11

## 2019-08-13 RX ADMIN — ALBUTEROL SULFATE SCH PUFF: 108 AEROSOL, METERED RESPIRATORY (INHALATION) at 06:48

## 2019-08-13 RX ADMIN — CARBAMAZEPINE SCH MG: 200 TABLET ORAL at 08:25

## 2019-08-13 RX ADMIN — CLONAZEPAM SCH MG: 0.5 TABLET ORAL at 00:00

## 2019-08-13 RX ADMIN — CLONAZEPAM SCH MG: 0.5 TABLET ORAL at 13:32

## 2019-08-13 NOTE — HISTORY & PHYSICAL
History of Present Illness


General


Reason for Hospitalization:  Missed Dialysis, hyperkalemia





Present Illness


Allergies:  


Coded Allergies:  


     No Known Allergies (Unverified , 8/11/19)





Medication History


Scheduled


Albuterol Sulfate (Ventolin Hfa), Unknown Dose INH EVERY 6 HOURS, (Reported)


Amlodipine Besylate* (Amlodipine Besylate*), Unknown Dose ORAL DAILY, (Reported)


Budesonide/Formoterol Fumarate (Symbicort 160-4.5 Mcg Inhaler), Unknown Dose 

INH TWICE A DAY, (Reported)


Carbamazepine (Tegretol*), Unknown Dose PO Q12HR, (Reported)


Clonazepam* (Klonopin*), Unknown Dose ORAL Q6H, (Reported)


Quetiapine Fumarate* (Seroquel*), Unknown Dose ORAL DAILY, (Reported)


Tiagabine Hcl* (Gabitril*), Unknown Dose ORAL DAILY, (Reported)





Miscellaneous Medications


Unable to Obtain Medications (Unable To Obtain Meds), (Reported)





Patient History


Healthcare decision maker





Resuscitation status


Full Code


Advanced Directive on File








Review of Systems


Review of Symptoms


General ROS: no weight loss or fever


Psychological ROS: no depression or mood changes, no memory loss


Ophthalmic ROS: no visual changes or eye irritation


ENT ROS: no nasal congestion, hearing loss, dizziness


Allergy and Immunology ROS: no allergic symptoms or urticaria


Hematological and Lymphatic ROS: no swollen glands, unusual bleeding or bruising


Endocrine ROS: no polyuria, polydipsia, weight changes, temperature intolerance


Respiratory ROS: no cough, shortness of breath, or wheezing


Cardiovascular ROS: no chest pain or dyspnea on exertion


Gastrointestinal ROS: denies abdominal pain, bright red blood in stool.


Musculoskeletal ROS: no myalgias or arthralgias


Neurological ROS: no TIA or stroke symptoms


Dermatological ROS: no new or changing skin lesions, rashes or pruritis





Physical Exam


Physical Exam


General appearance:  alert, cooperative, no distress, appears stated age


Head:  Normocephalic, without obvious abnormality, atraumatic


Eyes:  conjunctivae/corneas clear. PERRL, EOM's intact. Fundi benign


Throat:  Lips, mucosa, and tongue normal. Teeth and gums normal


Neck:  supple, symmetrical, trachea midline, no adenopathy, thyroid: not 

enlarged, symmetric, no tenderness/mass/nodules, no carotid bruit and no JVD


Lungs:  clear to auscultation bilaterally


Heart:  regular rate and rhythm, S1, S2 normal, no murmur, click, rub or gallop


Abdomen:  soft, non-tender. Bowel sounds normal. No masses,  no organomegaly


Extremities:  extremities normal, atraumatic, no cyanosis or edema


Pulses:  2+ and symmetric


Skin:  Skin color, texture, turgor normal. No rashes or lesions


Neurologic:  Grossly normal





Last 24 Hour Vital Signs








  Date Time  Temp Pulse Resp B/P (MAP) Pulse Ox O2 Delivery O2 Flow Rate FiO2


 


8/13/19 13:32      Room Air  21


 


8/13/19 13:32      Room Air  21 8/13/19 12:00  90      


 


8/13/19 12:00 98.7 88 20 135/77 (96) 98   


 


8/13/19 08:00 97.9 83 18 153/70 (97) 97   


 


8/13/19 08:00  81      


 


8/13/19 06:50  96 18  97 Room Air  21 8/13/19 06:50  97 20  98 Room Air  21 8/13/19 06:49  78 20  96 Room Air  21 8/13/19 06:49  76 20  97 Room Air  21 8/13/19 04:09 98.7       


 


8/13/19 04:00 98.0 96 20 190/90 (123) 97   


 


8/13/19 04:00  114      


 


8/13/19 02:43 98.7 90 18 160/93 (115) 98   


 


8/13/19 00:40      Room Air  21


 


8/13/19 00:40      Room Air  21 8/13/19 00:00  87      


 


8/12/19 21:00 98.3 92 18 158/79 (105) 95   


 


8/12/19 20:34 98.3 92 18 158/79 (105) 95   


 


8/12/19 20:05      Room Air  21


 


8/12/19 20:00 97.9 98 16 144/58 (86) 99   


 


8/12/19 20:00  98      


 


8/12/19 20:00      Room Air  


 


8/12/19 19:08  96 18  98 Room Air  21


 


8/12/19 19:07      Room Air  21


 


8/12/19 19:06  95 15  98 Room Air  21


 


8/12/19 19:00  98 16 119/61 (80) 99   


 


8/12/19 18:30  91 20 135/53 (80) 94   


 


8/12/19 18:00  101 18 171/68 (102) 99   


 


8/12/19 17:00  98 13 174/58 (96) 97   


 


8/12/19 16:00  86      


 


8/12/19 16:00      Room Air  


 


8/12/19 16:00 97.9 87 15 142/53 (82) 97   

















Intake and Output  


 


 8/12/19 8/13/19





 19:00 07:00


 


Intake Total 150 ml 120 ml


 


Output Total 2 ml 0 ml


 


Balance 148 ml 120 ml


 


  


 


Intake Oral 150 ml 120 ml


 


Output Urine Total 2 ml 0 ml


 


# Voids  4


 


# Bowel Movements 6 4











Laboratory Tests








Test


  8/13/19


13:00


 


White Blood Count


  5.9 K/UL


(4.8-10.8)


 


Red Blood Count


  4.04 M/UL


(4.20-5.40)  L


 


Hemoglobin


  14.4 G/DL


(12.0-16.0)


 


Hematocrit


  44.0 %


(37.0-47.0)


 


Mean Corpuscular Volume


  109 FL (80-99)


H


 


Mean Corpuscular Hemoglobin


  35.6 PG


(27.0-31.0)  H


 


Mean Corpuscular Hemoglobin


Concent 32.7 G/DL


(32.0-36.0)


 


Red Cell Distribution Width


  14.3 %


(11.6-14.8)


 


Platelet Count


  61 K/UL


(150-450)  L


 


Mean Platelet Volume


  6.5 FL


(6.5-10.1)


 


Neutrophils (%) (Auto)


  % (45.0-75.0)


 


 


Lymphocytes (%) (Auto)


  % (20.0-45.0)


 


 


Monocytes (%) (Auto)  % (1.0-10.0)  


 


Eosinophils (%) (Auto)  % (0.0-3.0)  


 


Basophils (%) (Auto)  % (0.0-2.0)  


 


Differential Total Cells


Counted 100  


 


 


Neutrophils % (Manual) 80 % (45-75)  H


 


Lymphocytes % (Manual) 8 % (20-45)  L


 


Monocytes % (Manual) 10 % (1-10)  


 


Eosinophils % (Manual) 2 % (0-3)  


 


Basophils % (Manual) 0 % (0-2)  


 


Band Neutrophils 0 % (0-8)  


 


Platelet Estimate Decreased  L


 


Platelet Morphology Normal  


 


Anisocytosis 1+  


 


Macrocytosis 1+  


 


Sodium Level


  141 MMOL/L


(136-145)


 


Potassium Level


  3.3 MMOL/L


(3.5-5.1)  L


 


Chloride Level


  102 MMOL/L


()


 


Carbon Dioxide Level


  22 MMOL/L


(21-32)


 


Anion Gap


  17 mmol/L


(5-15)  H


 


Blood Urea Nitrogen


  137 mg/dL


(7-18)  H


 


Creatinine


  9.8 MG/DL


(0.55-1.30)  H


 


Estimat Glomerular Filtration


Rate  mL/min (>60)  


 


 


Glucose Level


  99 MG/DL


()


 


Calcium Level


  9.6 MG/DL


(8.5-10.1)








Height (Feet):  5


Height (Inches):  2.50


Weight (Pounds):  126


Medications





Current Medications








 Medications


  (Trade)  Dose


 Ordered  Sig/Shasta


 Route


 PRN Reason  Start Time


 Stop Time Status Last Admin


Dose Admin


 


 Acetaminophen


  (Tylenol)  650 mg  Q4H  PRN


 ORAL


 Mild Pain/Temp > 100.5  8/12/19 22:30


 9/10/19 22:29  8/13/19 03:39


 


 


 Al Hydroxide/Mg


 Hydroxide


  (Mylanta)  30 ml  Q4H  PRN


 ORAL


 Abdominal cramps  8/12/19 22:30


 9/10/19 22:29   


 


 


 Albuterol Sulfate


  (Proventil MDI)  1 puff  Q6HRT


 INH


   8/13/19 01:00


 9/11/19 00:59  8/13/19 06:48


 


 


 Amlodipine


 Besylate


  (Norvasc)  10 mg  DAILY


 ORAL


   8/13/19 09:00


 9/11/19 08:59   


 


 


 Budesonide/


 Formoterol


 Fumarate


  (Symbicort 160/


 4.5)  1 puff  TWICE A  DAY


 INH


   8/13/19 09:00


 9/11/19 08:59  8/13/19 06:48


 


 


 Carbamazepine


  (TEGretol)  200 mg  Q12HR


 ORAL


   8/12/19 21:00


 9/10/19 22:44  8/13/19 08:25


 


 


 Clonazepam


  (KlonoPIN)  0.5 mg  Q6H


 ORAL


   8/13/19 00:00


 8/19/19 00:00  8/13/19 05:11


 


 


 Clonidine HCl


  (Catapres Tab)  0.1 mg  Q4H  PRN


 ORAL


 For High Blood Pressure  8/13/19 08:00


 9/12/19 07:59   


 


 


 Docusate Sodium


  (Colace)  100 mg  BID


 ORAL


   8/13/19 09:00


 9/11/19 08:59  8/13/19 08:24


 


 


 Magnesium


 Hydroxide


  (Mom)  30 ml  DAILYPRN  PRN


 ORAL


 Constipation  8/12/19 21:00


 9/11/19 20:59   


 


 


 Ondansetron HCl


  (Zofran)  4 mg  Q6H  PRN


 IVP


 Nausea & Vomiting  8/12/19 22:00


 9/11/19 09:59   


 


 


 Pantoprazole


  (Protonix)  40 mg  DAILY


 ORAL


   8/13/19 09:00


 9/11/19 08:59  8/13/19 08:25


 


 


 Quetiapine


 Fumarate


  (SEROquel)  25 mg  DAILY


 ORAL


   8/13/19 09:00


 9/11/19 08:59  8/13/19 08:23


 


 


 Sodium Chloride  1,000 ml @ 


 500 mls/hr  Q2H PRN


 IVLG


 sbp<90 during hd  8/12/19 22:43


 9/11/19 22:42   


 


 


 Sodium Chloride  1,000 ml @ 


 500 mls/hr  Q2H PRN


 IVLG


 sbp<90 during hd  8/13/19 06:00


 8/13/19 18:00   


 











Assessment/Plan


Status:  doing well, stable, progressing, tolerating diet, ambulating well


Status Narrative


The patient is observed to be receiving HD. She is in NAD, VSS and is 

conversational A&Ox4.


Assessment/Plan:


Neuro: A&Ox4


HEENT: head atraumatic, PERRLA, nose/mouth Pink/moist mucosa, throat supple


CVS: RRR


Lungs: Clear to ausc


Abd: no TTP, no organomegaly


GI/: no N/V/D/C, reports appetite, BS+, ESRD---HD now (will be discharged 

after)


Extremities: no edema, 2+ pop/pedal, no wounds noted





MIPS


Hospital declaration


  INPATIENT level of care is warranted for this patient because patient is a 89 

year old with *** who presents with suspicion of ***. I have a high level of 

concern because ***. Patient is at high risk for ***. Plan of care/treatment 

include ***. Patient care is expected to be greater than 2 midnights.  





  OBSERVATION level of care is warranted for this patient. Patient is a 89 year 

old with *** who presents with ***. Patient will be admitted for 1 midnight, 

but if additional night(s) is/are necessary, patient will be converted to 

inpatient status for the entire hospitalization





Disposition: Once the patient is stable to leave the hospital, I anticipate the 

patient will likely be discharged to the following environment:***





Estimated discharge date: ***





I spent 70 minutes on this patient's case, and *** minutes was dedicated to 

counseling and/or care coordination. 








MIPS ([employer]-based Incentive Payment System) 


Applicable CPT: 73030, 29401





CHECK ALL THAT ARE MET:





  Measure #5 (CHF): All ages. Prescribe ACE/ARB upon discharge for patients 

with left ventricular systolic dysfunction. If not, the reason is clearly 

documented in the medical chart.





  Measure #8 (CHF): All ages. Prescribe a beta blocker upon discharge for 

patients with left ventricular systolic dysfunction. If not, the reason is 

clearly documented in the medical chart.





  Measure #47 Advance care plan or surrogate decision maker documented in the 

medical record. 





  Measure #130 The provider has documented, updated, or reviewed the patients 

current medication list and has documented it in the patients note.  





  Measure #374 (All): Send report to referring provider. 





  Measure #407(Sepsis due to MSSA bacteremia): Age 18+ Patient treated with a 

beta-lactam antibiotic (Nafcillin, Oxacillin or Cefazolin) as definitive 

therapy. 








MEDICAL COMPLEXITY


High complexity medical decision making (need 2/3 categories)





Problem - need 4 points


  Acute/new problem with new plan for workup (4 points, 1 max)


  Acute/new problem without additional workup (3 points, 1 max)


  Unstable chronic problem actively being managed (2 point each, 2 max)


  Stable chronic problem actively being managed (1 point each, 2 max)


  Self-limited/transient process (constipation, muscle ache, etc) (1 point each

, 2 max)


 





Data - need 4 points


  Reviewed labs/imaging studies (1 points, 2 max)


  Independent review of imaging (EKG, xrays, etc) (2 points, 2 max)


  Discussed case with consult/other MD/RN (2 points, 2 max)





High Risk - qualify if have one of the following:


  Severe exacerbation of acute problem, acute mental status change, IV narcotics

, monitoring drug levels (vancomycin, INR, tacrolimus etc)











Kala Schwarz N.P. Aug 13, 2019 15:28

## 2019-08-13 NOTE — NUR
NURSE NOTES:

pt was discharged per MD orders.Heart monitor removed and returned to monitor tech. IV 
removed. No redness or swelling noted. All belongings accounted for.

## 2019-08-13 NOTE — NUR
NURSE NOTES:

Pt is upset that her boyfriend is not here. SHe wants to leave AMA without receiving HD. Pt 
is refusing medication. States "I want to go home". Notified Dr. Tellez

## 2019-08-14 NOTE — NUR
*-* INSURANCE *-*+





ALL CLINICALS AND REVIEWS HAVE BEEN FAXED TO:



[facility]

NCM:[facility]

P: [phone]

F: [phone]

## 2019-08-14 NOTE — DISCHARGE SUMMARY
Discharge Summary


Discharge Summary


_


DATE OF ADMISSION: 8/11/2019





DATE OF DISCHARGE: 8/13/2019








DISCHARGED BY: Dr. Tellez 





REASON FOR ADMISSION: 


73 years old female with past medical history of hypertension, COPD, end-stage 

renal disease, on hemodialysis, seizure disorder, hypothyroidism, depression, 

presented to the emergency department with diarrhea and generalized weakness.  


Patient reported missed hemodialysis and stated that her last dialysis was more 

than 1 week ago.  


Patient is normally dialyzed 3 times a week.  


Patient still making some urine. 


Upon evaluation blood pressure was elevated 179/74.  


Laboratory work-up revealed no leukocytosis, stable hemoglobin and hematocrit.  


Potassium 7.9.  CO2 15.  Anion gap 20.  


, creatinine 14. 


Troponin -0.045.  EKG revealed sinus rhythm with left bundle branch block.  


Urinalysis revealed +3 protein , +2 glucose ,  pyuria and moderate bacteria.


CT of the abdomen and pelvis revealed no acute findings to account for 

abdominal pain.


n emergency department patient received  oral Kayexalate and IV Lasix.  


Patient subsequently was admitted to telemetry floor for further management. 





CONSULTANTS:


nephrologist Dr. Lyman  


 


[facility] COURSE: 


Patient admitted to telemetry floor.  


Nephrologist consulted for emergent hemodialysis.  


Hyperkalemia was treated in emergency room and resolved.  


Hemodialysis was arranged as per her nephrologist with back-to-back 

hemodialysis.  


Volumes,  renal parameters  and electrolytes were closely monitored.  


Electrolytes corrected as needed.


Blood pressure was managed with calcium channel blocker.  Clonidine was on 

board as needed.


DVT and GI prophylaxis provided.





Pain management was addressed as needed.  


Diarrhea resolved.  


Patient had later  constipation . Bowel regimen instituted.  


Symbicort continued.  No signs of respiratory distress .


Pulse oximetry was stable on room air.  


Seizure precaution maintained.  Tegretol continued.  


No evidence of seizure activity while in the hospital.  





Patient clinically stabilized and was ready for discharge home with home health 

services if able to arrange.


Reinforced compliance with outpatient hemodialysis as scheduled.





Urine culture  resulted in growth of Klebsiella, after patient was discharged.


Patient was informed of urine culture result and need for antibiotic.


Prescription was called to pharmacy . 





FINAL DIAGNOSES: 


Hyperkalemia-resolved


Uremia due to noncompliance/missed hemodialysis for >1 week


Diarrhea-resolved


End-stage renal disease , on hemodialysis 


Klebsiella UTI


Metabolic acidosis-resolved


Hypertension , poorly controlled





DISCHARGE MEDICATIONS:


See Medication Reconciliation list.





DISCHARGE INSTRUCTIONS:


Patient was discharged home.


Follow up with primary care provider in one week.


Follow-up with outpatient hemodialysis as scheduled.





I have been assigned to dictate discharge summary for this account. 


I was not involved in the patient's management.











Elly Aguila NP Aug 14, 2019 08:44

## 2019-08-15 NOTE — NUR
*-* INSURANCE *-*+





DISCHARGE SUMMARY HAVE BEEN FAXED TO:



[facility]

NCM:[facility]

P: [phone]

F: [phone]